# Patient Record
Sex: FEMALE | Race: WHITE | Employment: FULL TIME | URBAN - METROPOLITAN AREA
[De-identification: names, ages, dates, MRNs, and addresses within clinical notes are randomized per-mention and may not be internally consistent; named-entity substitution may affect disease eponyms.]

---

## 2017-03-17 ENCOUNTER — OFFICE VISIT (OUTPATIENT)
Dept: FAMILY MEDICINE | Facility: CLINIC | Age: 57
End: 2017-03-17

## 2017-03-17 VITALS
RESPIRATION RATE: 20 BRPM | WEIGHT: 170 LBS | TEMPERATURE: 98.1 F | HEART RATE: 67 BPM | DIASTOLIC BLOOD PRESSURE: 65 MMHG | SYSTOLIC BLOOD PRESSURE: 120 MMHG | BODY MASS INDEX: 31.28 KG/M2 | HEIGHT: 62 IN

## 2017-03-17 DIAGNOSIS — M25.561 PATELLOFEMORAL INSTABILITY OF RIGHT KNEE WITH PAIN: Primary | ICD-10-CM

## 2017-03-17 DIAGNOSIS — M25.361 PATELLOFEMORAL INSTABILITY OF RIGHT KNEE WITH PAIN: Primary | ICD-10-CM

## 2017-03-17 PROCEDURE — 99214 OFFICE O/P EST MOD 30 MIN: CPT | Performed by: FAMILY MEDICINE

## 2017-03-17 RX ORDER — IPRATROPIUM BROMIDE AND ALBUTEROL SULFATE 2.5; .5 MG/3ML; MG/3ML
3 SOLUTION RESPIRATORY (INHALATION)
COMMUNITY
Start: 2011-05-29

## 2017-03-17 NOTE — PROGRESS NOTES
"  SUBJECTIVE:                                                    Patricia Cedeño is a 56 year old female who presents to clinic today for the following health issues:      Joint Pain     Onset: x2 months    Description:   Location: right knee  Character: Dull ache    Intensity: moderate    Progression of Symptoms: worse    Accompanying Signs & Symptoms:  Other symptoms: none   History:   Previous similar pain: no       Precipitating factors:   Trauma or overuse: YES    Alleviating factors:  Improved by: rest/inactivity       Therapies Tried and outcome:     Fell about 2 months ago and has since had pain. Notes a lot of popping and locking of right knee. Prior to injury 2 months ago, had always had issues with her right knee. Per  this pain is now limiting her from living her life like normal.   She admits she finds herself having to adjust how she positions her knee due to excruciating pain. Usually has the worse pain when going up/down stairs or getting into bed or her car. Denies any numbness or tingling. Also states she feels as \"there is something loose in there that shifted yesterday\".           Problem list and histories reviewed & adjusted, as indicated.  Additional history: as documented    Patient Active Problem List   Diagnosis     CARDIOVASCULAR SCREENING; LDL GOAL LESS THAN 160     Intermittent asthma     Past Surgical History   Procedure Laterality Date     Ectopic pregnancy surgery  1989     removed ovary (? side)       Social History   Substance Use Topics     Smoking status: Never Smoker     Smokeless tobacco: Never Used     Alcohol use No     Family History   Problem Relation Age of Onset     DIABETES Father      type 2           Reviewed and updated as needed this visit by clinical staff  Tobacco  Allergies       Reviewed and updated as needed this visit by Provider         ROS:  Constitutional, msk, neuro systems are negative, except as otherwise noted.    OBJECTIVE:                    " "                                /65 (BP Location: Right arm, Patient Position: Chair, Cuff Size: Adult Large)  Pulse 67  Temp 98.1  F (36.7  C) (Oral)  Resp 20  Ht 5' 2\" (1.575 m)  Wt 170 lb (77.1 kg)  LMP 10/15/2014  Breastfeeding? No  BMI 31.09 kg/m2  Body mass index is 31.09 kg/(m^2).  GENERAL: healthy, alert and no distress  MS: no knee asymmetry noted. Right knee- some joint effusion noted,+ crepitus, negative anterior/posterior drawer test, + valgus, + patella grind test, + namita test   NEURO: sensation intact    Diagnostic Test Results:  none      ASSESSMENT/PLAN:                                                        1. Patellofemoral instability of right knee with pain  - will obtain MRI for further evaluation and refer to ortho once results are available.   Discussed NSAIDS, knee brace for support, ice/heat.   - MR Knee Right w/o Contrast; Future    See Patient Instructions    Tiara Chilel MD  [facility]    "

## 2017-03-17 NOTE — NURSING NOTE
"Chief Complaint   Patient presents with     Knee Pain     right knee pain x2 months       Initial /65 (BP Location: Right arm, Patient Position: Chair, Cuff Size: Adult Large)  Pulse 67  Temp 98.1  F (36.7  C) (Oral)  Resp 20  Ht 5' 2\" (1.575 m)  Wt 170 lb (77.1 kg)  LMP 10/15/2014  Breastfeeding? No  BMI 31.09 kg/m2 Estimated body mass index is 31.09 kg/(m^2) as calculated from the following:    Height as of this encounter: 5' 2\" (1.575 m).    Weight as of this encounter: 170 lb (77.1 kg).  Medication Reconciliation: complete     Micheline Bullard/[facility]---[facility]      "

## 2017-03-17 NOTE — PATIENT INSTRUCTIONS
Recommend knee brace to help support   Follow up once MRI is done     Knee Pain of Uncertain Cause    There are several common causes for knee pain. These can include:    A sprain of the ligaments that support the joint    An injury to the cartilage lining of the joint    Arthritis from wear-and-tear or inflammation  There are other causes as well. There may also be swelling, reduced movement of the knee joint, and pain with walking. A definite diagnosis will still need to be made. If your symptoms do not improve, further follow-up and testing may be needed.  Home care    Stay off the injured leg as much as possible until pain improves.    Apply an ice pack over the injured area for 15 to 20 minutes every 3 to 6 hours. You should do this for the first 24 to 48 hours. You can make an ice pack by filling a plastic bag that seals at the top with ice cubes and then wrapping it with a thin towel. Continue to use ice packs for relief of pain and swelling as needed. As the ice melts, be careful to avoid getting your wrap, splint, or cast wet. After 48 hours, apply heat (warm shower or warm bath) for 15 to 20 minutes several times a day, or alternate ice and heat. If you have to wear a hook-and-loop knee brace, you can open it to apply the ice pack, or heat, directly to the knee. Never put ice directly on the skin. Always wrap the ice in a towel or other type of cloth.    You may use over-the-counter pain medicine to control pain, unless another pain medicine was prescribed. If you have chronic liver or kidney disease or ever had a stomach ulcer or GI bleeding, talk with your healthcare provider using these medicines.    If crutches or a walker have been recommended, do not put weight on the injured leg until you can do so without pain. Check with your healthcare provider before returning to sports or full work duties.    If you have a hook-and-loop knee brace, you can remove it to bathe and sleep, unless told  otherwise.  Follow-up care  Follow up with your healthcare provider as advised. This is usually within 1-2 weeks.  If X-rays were taken, you will be told of any new findings that may affect your care.  Call 911  Call 911 if you have:     Shortness of breath    Chest pain  When to seek medical advice  Call your healthcare provider right away if any of these occur:    Toes or foot becomes swollen, cold, blue, numb, or tingly    Pain or swelling spreads over the knee or calf    Warmth or redness appears over the knee or calf    Other joints become painful    Rash appears    Fever of 100.4 F (38 C) or above lasting for 24 to 48 hours    1603-9989 [employer]. [address] [place] [address], [place] [address]. All rights reserved. This information is not intended as a substitute for professional medical care. Always follow your healthcare professional's instructions.

## 2017-03-18 ASSESSMENT — ASTHMA QUESTIONNAIRES: ACT_TOTALSCORE: 20

## 2017-03-21 ENCOUNTER — HOSPITAL ENCOUNTER (OUTPATIENT)
Dept: MRI IMAGING | Facility: CLINIC | Age: 57
Discharge: HOME | End: 2017-03-21
Attending: FAMILY MEDICINE | Admitting: FAMILY MEDICINE

## 2017-03-21 DIAGNOSIS — M25.361 PATELLOFEMORAL INSTABILITY OF RIGHT KNEE WITH PAIN: ICD-10-CM

## 2017-03-21 DIAGNOSIS — M25.561 PATELLOFEMORAL INSTABILITY OF RIGHT KNEE WITH PAIN: ICD-10-CM

## 2017-03-21 PROCEDURE — 73721 MRI JNT OF LWR EXTRE W/O DYE: CPT | Mod: RT

## 2017-03-23 ENCOUNTER — TELEPHONE (OUTPATIENT)
Dept: FAMILY MEDICINE | Facility: CLINIC | Age: 57
End: 2017-03-23

## 2017-03-23 DIAGNOSIS — M23.329: Primary | ICD-10-CM

## 2017-03-24 NOTE — TELEPHONE ENCOUNTER
L/M to call.  Darlyn Richard RN    Orthopedic  Services at [facility]  All areas ~ [phone]

## 2017-03-24 NOTE — TELEPHONE ENCOUNTER
MRI shows a tear. Recommend follow up with ortho for further evaluation.   Referral placed.     Tiara Chiell MD

## 2017-03-27 ENCOUNTER — OFFICE VISIT (OUTPATIENT)
Dept: ORTHOPEDICS | Facility: CLINIC | Age: 57
End: 2017-03-27

## 2017-03-27 VITALS
SYSTOLIC BLOOD PRESSURE: 104 MMHG | WEIGHT: 170 LBS | BODY MASS INDEX: 31.28 KG/M2 | DIASTOLIC BLOOD PRESSURE: 66 MMHG | HEIGHT: 62 IN

## 2017-03-27 DIAGNOSIS — M17.11 PRIMARY OSTEOARTHRITIS OF RIGHT KNEE: Primary | ICD-10-CM

## 2017-03-27 PROCEDURE — 99203 OFFICE O/P NEW LOW 30 MIN: CPT | Mod: 25 | Performed by: ORTHOPAEDIC SURGERY

## 2017-03-27 PROCEDURE — 20610 DRAIN/INJ JOINT/BURSA W/O US: CPT | Mod: RT | Performed by: ORTHOPAEDIC SURGERY

## 2017-03-27 RX ORDER — DEXAMETHASONE SODIUM PHOSPHATE 4 MG/ML
8 INJECTION, SOLUTION INTRA-ARTICULAR; INTRALESIONAL; INTRAMUSCULAR; INTRAVENOUS; SOFT TISSUE ONCE
Qty: 2 ML | Refills: 0 | OUTPATIENT
Start: 2017-03-27 | End: 2017-03-27

## 2017-03-27 RX ORDER — LIDOCAINE HYDROCHLORIDE 10 MG/ML
8 INJECTION, SOLUTION INFILTRATION; PERINEURAL ONCE
Qty: 8 ML | Refills: 0 | OUTPATIENT
Start: 2017-03-27 | End: 2017-03-27

## 2017-03-27 NOTE — MR AVS SNAPSHOT
"              After Visit Summary   3/27/2017    Patricia Cedeño    MRN: [id number]           Patient Information     Date Of Birth          1960        Visit Information        Provider Department      3/27/2017 8:50 AM Nam Brooke MD [facility] ORTHOPEDIC SURGERY        Care Instructions    We discussed the problem of arthritis today. Arthritis means that the joint surfaces that were once smooth are getting rough. When the rough joint surfaces are loaded and gliding, you often have pain, swelling, catching/popping, and locking type of symptoms. It can be episodic or constant. Even though the process is slow developing and chronic in nature, the symptoms can come on rather suddenly sometimes triggered by an injury or at other times without any identifiable event.  Typically, even with arthritis, most people can  function quite well with a common sense management which include: activity modifications, OTC medications (e.g.. Acetaminophen and Ibuprofen or Naproxen), icing, stretching and muscle strengthening. In general, staying active helps because it will help maintaining joint flexibility and muscle strength although \"overdoing\" and doing repetitively loading activities could worsen the symptoms. If you carry extra weight, losing weight should be a part of management of arthritis. I recommend diet along with gentle aerobic exercises such as walking, elliptical, swimming and stationary biking. Activities like jumping, pivoting, squatting, lunging, stair climbing, and kneeling are better to be avoided.  You can also try over- the- counter braces (especially for the knee arthritis) but since the problem is an internal issue, it is not always helpful.  Formal PT is not always necessary but can be helpful if you are not sure about what exercises to do. A discussion with a dietician can be very helpful if you decided to include weight loss as a part of the treatment.  If these measures are not " "effective, we often resort to the injection treatment, either cortisone or viscous joint supplement. It has been shown that viscous joint injections are not very effective if arthritis is advanced and at this point is approved only for knee arthritis. The potential benefit from injections are not permanent and for that reason they can be repeated at a reasonable frequency. The duration of benefit is impossible to predict. Sometimes, it does not provide any noticeable benefit at all.  As you can imagine, surgical intervention is not the first line of management. It is important to remember that even with surgery one cannot cure arthritis unless joint replacement is considered. As was the case with the injection treatment, the response from arthroscopic surgeries  is unpredictable since we cannot make the joint surfaces back to perfectly smooth. It would be an option for someone who has tried all appropriate non-operative treatment modalities and is not quite ready for replacement type of permanent procedure. Arthroscopic procedures are more applicable to the knees, the shoulders and the ankles as opposed to the hips and fingers.          The diagnosis of \"patella-femoral pain syndrome\" or \"patella-femoral dysfunction\" is a broad inclusive condition that describes pain localized in  the front aspect of a knee. These terms are often interchanged with \"patella chondromalacia\" although chondromalacia specifically refers to softening of  joint surface cartilage. Often, pain is caused by chondromalacia or early arthritis, however, one can have pain even though there are no apparent damages that can be identified on the joint surfaces. In this case the pain is most likely due to mal-tracking of the patella on the femur. In this case, the pressure on the knee cap is not evenly distributed and some part of the patella gets overloaded and thus the pain. This problem is not always easy to identify because mal-tracking is " happening only when one is engaged in an activity such as jumping, running or pivoting. Static examination can be totally normal. There are many potential causes for the mal-tracking: prior injuries, knee mis-alignment, muscle imbalance/weakness, in-toeing gait, hyper-flexibility and shallow groove for the patella.    Pain is typically noted with kneeling, squatting, climbing, going up and down steps and long drives. At an early stage, pain is worse in the late afternoon or in the evening not while one is doing the activities. One can feel and hear cracking, popping and grinding with these activities.   Pain is very often felt in the back of the knee even though the pathology is in the front. This is because of muscle/tendon tightness/spasm resulting from protective compensation by hamstring structures.     Obesity is a big contributing factor to pain related to this issue. The stress under the patella is not in an one-to-one relationship. In other words, 10 Lbs extra weight is not 10 Lbs extra pressure in the patella-femoral joint. With regular activities, the extra pressure in the patella-femoral area is easily 20-40 Lbs. Thus, lies the importance of reducing weight if you carry extra weight.     As far as treatment goes, usual RIM (rest, ice, and medication) should be tried first. Patella sleeve is often helpful but not always. In general, gliding activities such as stationary biking and elliptical are better than the activities that put extra stress to the patella-femoral joint space. These include jumping, lunging, stair climbing, hiking, pivoting and kneeling etc.    In order to better control the movement of the patella on the femur, strong quadriceps muscles are very important. One can have an imbalance of the quad muscle: stronger outer quad (lateralis) compared to inner quad (medialis). In this situation, strengthening of the inner thigh muscle should be main emphasis of the treatment. Working with a  "physical therapist is often utilized for this purpose.    If all appropriate non-surgical options did not result in satisfactory pain relief, surgery can be considered. Unfortunately, not all surgeries result in desired outcome. Depending on the type of operation, healing process is widely different: a couple of weeks to several months. One should remember surgery for this particular diagnosis should not be the first line of treatment armamentarium.        Follow-ups after your visit        Who to contact     If you have questions or need follow up information about today's clinic visit or your schedule please contact [facility] ORTHOPEDIC SURGERY directly at [phone].  Normal or non-critical lab and imaging results will be communicated to you by "[employer]hart, letter or phone within 4 business days after the clinic has received the results. If you do not hear from us within 7 days, please contact the clinic through [employer] or phone. If you have a critical or abnormal lab result, we will notify you by phone as soon as possible.  Submit refill requests through [employer] or call your pharmacy and they will forward the refill request to us. Please allow 3 business days for your refill to be completed.          Additional Information About Your Visit        MyChart Information     [employer] lets you send messages to your doctor, view your test results, renew your prescriptions, schedule appointments and more. To sign up, go to [email or web address] . Click on \"Log in\" on the left side of the screen, which will take you to the Welcome page. Then click on \"Sign up Now\" on the right side of the page.     You will be asked to enter the access code listed below, as well as some personal information. Please follow the directions to create your username and password.     Your access code is: [email or web address]  Expires: 6/15/2017  4:06 PM     Your access code will  in 90 days. If you need help or a new code, please call your " "[facility] or [phone].        Care EveryWhere ID     This is your Care EveryWhere ID. This could be used by other organizations to access your [place] medical records  [id number]        Your Vitals Were     Height Last Period BMI (Body Mass Index)             5' 2\" (1.575 m) 10/15/2014 31.09 kg/m2          Blood Pressure from Last 3 Encounters:   03/27/17 104/66   03/17/17 120/65   11/17/16 127/88    Weight from Last 3 Encounters:   03/27/17 170 lb (77.1 kg)   03/17/17 170 lb (77.1 kg)   11/17/16 160 lb (72.6 kg)              Today, you had the following     No orders found for display       Primary Care Provider Office Phone # Fax #    LIVIA Harper [phone]       [facility] [address] [place] [address]        Thank you!     Thank you for choosing [facility] ORTHOPEDIC SURGERY  for your care. Our goal is always to provide you with excellent care. Hearing back from our patients is one way we can continue to improve our services. Please take a few minutes to complete the written survey that you may receive in the mail after your visit with us. Thank you!             Your Updated Medication List - Protect others around you: Learn how to safely use, store and throw away your medicines at www.disposemymeds.org.          This list is accurate as of: 3/27/17  9:15 AM.  Always use your most recent med list.                   Brand Name Dispense Instructions for use    albuterol 108 (90 BASE) MCG/ACT Inhaler    PROAIR HFA/PROVENTIL HFA/VENTOLIN HFA    1 each    Inhale 2 puffs into the lungs every 6 hours       DUONEB 0.5-2.5 (3) MG/3ML neb solution   Generic drug:  ipratropium - albuterol 0.5 mg/2.5 mg/3 mL      Inhale 3 mLs into the lungs Reported on 3/27/2017       fluocinolone 0.01 % cream    SYNALAR    60 g    Apply 1 g topically 2 times daily       mometasone-formoterol 100-5 MCG/ACT oral inhaler    DULERA    1 Inhaler    Inhale 2 puffs into the " lungs 2 times daily       ZYRTEC ALLERGY 10 MG tablet   Generic drug:  cetirizine      Take 10 mg by mouth daily.

## 2017-03-27 NOTE — LETTER
3/27/2017       RE: Patricia Cedeño  [id number] [facility] [id number]           Dear Colleague,    Thank you for referring your patient, Patricia Cedeño, to the [facility]. Please see a copy of my visit note below.    HISTORY OF PRESENT ILLNESS:    Patricia Cedeño is a 56 year old female who is seen in consultation at the request of Dr. Chilel for right knee pain    Present symptoms: Pt states she fell on the ice about 2 months ago, landing on the right knee.  Pt states pain is over the anterior knee, has locking, catching, clicking and grinding.  Pt states pain is worse throughout the day.  Pt states stairs are painful, sit to stand transitions as well.  Keeping knee flexed will cause knee to ache.  Pt states pain has been worsening over time.  She has been having intermittent catching and pain with a right knee  Over the last two years. She has noted some catching sensation in the left knee as well.  She has difficulty of getting up from sitting. Getting out of the car is painful.  Treatments tried to this point: MRI, ibuprofen, tylenol, ice, knee brace  Orthopedic PMH: no ortho surgeries     Past Medical History:   Diagnosis Date     Acute eczema of hand      Uncomplicated asthma        Past Surgical History:   Procedure Laterality Date     ECTOPIC PREGNANCY SURGERY  1989    removed ovary (? side)       Family History   Problem Relation Age of Onset     DIABETES Father      type 2       Social History     Social History     Marital status:      Spouse name: N/A     Number of children: N/A     Years of education: N/A     Occupational History     Not on file.     Social History Main Topics     Smoking status: Never Smoker     Smokeless tobacco: Never Used     Alcohol use No     Drug use: No     Sexual activity: Yes     Partners: Male      Comment: Vas.     Other Topics Concern     Not on file     Social History Narrative       Current Outpatient  "Prescriptions   Medication Sig Dispense Refill     mometasone-formoterol (DULERA) 100-5 MCG/ACT oral inhaler Inhale 2 puffs into the lungs 2 times daily 1 Inhaler 12     fluocinolone (SYNALAR) 0.01 % cream Apply 1 g topically 2 times daily 60 g 5     cetirizine (ZYRTEC ALLERGY) 10 MG tablet Take 10 mg by mouth daily.       ipratropium - albuterol 0.5 mg/2.5 mg/3 mL (DUONEB) 0.5-2.5 (3) MG/3ML neb solution Inhale 3 mLs into the lungs Reported on 3/27/2017       albuterol (PROAIR HFA, PROVENTIL HFA, VENTOLIN HFA) 108 (90 BASE) MCG/ACT inhaler Inhale 2 puffs into the lungs every 6 hours (Patient not taking: Reported on 3/27/2017) 1 each 12       Allergies   Allergen Reactions     Escitalopram Nausea     Metoclopramide      Dystonic reaction     Prochlorperazine        REVIEW OF SYSTEMS:  CONSTITUTIONAL:  NEGATIVE for fever, chills, change in weight  INTEGUMENTARY/SKIN:  Eczema. NEGATIVE for worrisome rashes, moles or lesions  EYES:  NEGATIVE for vision changes or irritation  ENT/MOUTH:  NEGATIVE for ear, mouth and throat problems  RESP:  SOB, asthma   BREAST:  NEGATIVE for masses, tenderness or discharge  CV:  NEGATIVE for chest pain, palpitations or peripheral edema  GI:  Acid reflux / heartburn  :  Negative   MUSCULOSKELETAL:  See HPI above  NEURO:  NEGATIVE for weakness, dizziness or paresthesias  ENDOCRINE:  NEGATIVE for temperature intolerance, skin/hair changes  HEME/ALLERGY/IMMUNE:  NEGATIVE for bleeding problems  PSYCHIATRIC:  NEGATIVE for changes in mood or affect      PHYSICAL EXAM:  /66 (BP Location: Right arm, Patient Position: Chair, Cuff Size: Adult Regular)  Ht 5' 2\" (1.575 m)  Wt 170 lb (77.1 kg)  LMP 10/15/2014  BMI 31.09 kg/m2  Body mass index is 31.09 kg/(m^2).   GENERAL APPEARANCE: healthy, alert and no distress   HEENT: No apparent thyroid megaly. Clear sclera with normal ocular movement  RESPIRATORY: No labored breathing  SKIN: no suspicious lesions or rashes  NEURO: Normal strength " and tone, mentation intact and speech normal  VASCULAR: Good pulses, and capillary refill   LYMPH: no lymphadenopathy   PSYCH:  mentation appears normal and affect normal/bright    MUSCULOSKELETAL:  She walks with noticeable limp  Minimal swelling on the right knee compared to the left  Range of motion is virtually symmetrical  She has marked crepitus on the right and moderate crepitus in the left knee at the patellofemoral joint  Positive tenderness patellofemoral joint, right  Similarly, left patellofemoral joint is tender as well  Medial joint line is tender more so on the left knee than right  Ligaments are stable throughout  Extensor mechanism is intact     ASSESSMENT:  Patellofemoral DJD, main source of the pain, right knee  Relatively asymptomatic patellofemoral DJD, left knee  Bilateral degenerative medial meniscus tear, not symptomatic    PLAN:  Current situation was thoroughly explained.  Despite presence of meniscus pathology noted on MRI scan, the main pathology was felt to be significant chondromalacia of the patella.  Recent falling resulting in hitting of the patellofemoral joint was felt to the main source of aggravation of the underlying problem.  The images of MRI scan from March 21, 2017 were visualized. Findings were thoroughly explained.  All questions were answered.  With understanding this patient, at this point, a core injection was felt to be very reasonable.  Potential risks and benefits of cortisone injection were explained.  Follow-up in six weeks if pain persists.      Procedure Note:  After risks and benefits were explained and questions answered, pt agreed to undergo a right knee injection. Under sterile prepping of the skin with alcohol and with use of ethyl chloride, a combination of 8 mg of dexamethasone and 8 ml of 1% Xylocaine was given into the knee joint. Inferomedial approach was used for the injection. Patient tolerated it well. No apparent complications from the procedure  noted.      Imaging Interpretation:     Recent Results (from the past 744 hour(s))   MR Knee Right w/o Contrast    Narrative    MR KNEE RIGHT WITHOUT CONTRAST 3/21/2017 5:00 PM    HISTORY:  Worsening right knee pain. Other instability, right knee.  Pain in right knee.    TECHNIQUE:  Axial and coronal T2 with fat suppression. Coronal T1.  Sagittal dual echo T2.    FINDINGS:   Medial Meniscus: Moderate extrusion of the body segment. There is  degenerative tearing of the posterior horn with subtle articular  extension; This could require careful probing at arthroscopy. There is  also asymmetric intrasubstance signal within the body segment with no  definite articular surface tear identified. However, if arthroscopy is  performed, close attention should be directed to this region.       Lateral Meniscus: No tear, displaced fragment, or extrusion.        Anterior Cruciate Ligament: Intact.     Posterior Cruciate Ligament: Intact.     Medial Collateral Ligament: Intact.    Lateral Collateral Ligament Complex, Popliteus Tendon: The iliotibial  band, fibular collateral ligament, biceps femoris tendon, and  popliteus tendon are intact.    Osseous and Cartilaginous Structures: Patellar chondromalacia  including grade 3 involvement. Grade II chondromalacia of the medial  trochlea.    Extensor Mechanism: The quadriceps and patellar tendons are intact.  The medial and lateral patellar retinacula appear unremarkable.    Joint Space: Mild-moderate joint effusion. No definite loose bodies  appreciated.    Additional Findings: No Baker's cyst.  No semimembranosus-tibial  collateral ligament or pes anserine bursitis.      Impression    IMPRESSION:  1. Mild focal degenerative tearing of the posterior horn medial  meniscus. Intrasubstance signal within the medial body segment, as  above.  2. Patellofemoral chondromalacia.  3. Mild-moderate effusion.    MD Nam PINTO MD  Department of Orthopedic Surgery         Disclaimer: This note consists of symbols derived from keyboarding, dictation and/or voice recognition software. As a result, there may be errors in the script that have gone undetected. Please consider this when interpreting information found in this chart.      Again, thank you for allowing me to participate in the care of your patient.        Sincerely,    Nam Brooke MD

## 2017-03-27 NOTE — NURSING NOTE
"Chief Complaint   Patient presents with     Knee Pain     Right knee       Initial /66 (BP Location: Right arm, Patient Position: Chair, Cuff Size: Adult Regular)  Ht 5' 2\" (1.575 m)  Wt 170 lb (77.1 kg)  LMP 10/15/2014  BMI 31.09 kg/m2 Estimated body mass index is 31.09 kg/(m^2) as calculated from the following:    Height as of this encounter: 5' 2\" (1.575 m).    Weight as of this encounter: 170 lb (77.1 kg).  Medication Reconciliation: complete    "

## 2017-03-27 NOTE — PROGRESS NOTES
HISTORY OF PRESENT ILLNESS:    Patricia Cedeño is a 56 year old female who is seen in consultation at the request of Dr. Chilel for right knee pain    Present symptoms: Pt states she fell on the ice about 2 months ago, landing on the right knee.  Pt states pain is over the anterior knee, has locking, catching, clicking and grinding.  Pt states pain is worse throughout the day.  Pt states stairs are painful, sit to stand transitions as well.  Keeping knee flexed will cause knee to ache.  Pt states pain has been worsening over time.  She has been having intermittent catching and pain with a right knee  Over the last two years. She has noted some catching sensation in the left knee as well.  She has difficulty of getting up from sitting. Getting out of the car is painful.  Treatments tried to this point: MRI, ibuprofen, tylenol, ice, knee brace  Orthopedic PMH: no ortho surgeries     Past Medical History:   Diagnosis Date     Acute eczema of hand      Uncomplicated asthma        Past Surgical History:   Procedure Laterality Date     ECTOPIC PREGNANCY SURGERY  1989    removed ovary (? side)       Family History   Problem Relation Age of Onset     DIABETES Father      type 2       Social History     Social History     Marital status:      Spouse name: N/A     Number of children: N/A     Years of education: N/A     Occupational History     Not on file.     Social History Main Topics     Smoking status: Never Smoker     Smokeless tobacco: Never Used     Alcohol use No     Drug use: No     Sexual activity: Yes     Partners: Male      Comment: Vas.     Other Topics Concern     Not on file     Social History Narrative       Current Outpatient Prescriptions   Medication Sig Dispense Refill     mometasone-formoterol (DULERA) 100-5 MCG/ACT oral inhaler Inhale 2 puffs into the lungs 2 times daily 1 Inhaler 12     fluocinolone (SYNALAR) 0.01 % cream Apply 1 g topically 2 times daily 60 g 5     cetirizine (ZYRTEC  "ALLERGY) 10 MG tablet Take 10 mg by mouth daily.       ipratropium - albuterol 0.5 mg/2.5 mg/3 mL (DUONEB) 0.5-2.5 (3) MG/3ML neb solution Inhale 3 mLs into the lungs Reported on 3/27/2017       albuterol (PROAIR HFA, PROVENTIL HFA, VENTOLIN HFA) 108 (90 BASE) MCG/ACT inhaler Inhale 2 puffs into the lungs every 6 hours (Patient not taking: Reported on 3/27/2017) 1 each 12       Allergies   Allergen Reactions     Escitalopram Nausea     Metoclopramide      Dystonic reaction     Prochlorperazine        REVIEW OF SYSTEMS:  CONSTITUTIONAL:  NEGATIVE for fever, chills, change in weight  INTEGUMENTARY/SKIN:  Eczema. NEGATIVE for worrisome rashes, moles or lesions  EYES:  NEGATIVE for vision changes or irritation  ENT/MOUTH:  NEGATIVE for ear, mouth and throat problems  RESP:  SOB, asthma   BREAST:  NEGATIVE for masses, tenderness or discharge  CV:  NEGATIVE for chest pain, palpitations or peripheral edema  GI:  Acid reflux / heartburn  :  Negative   MUSCULOSKELETAL:  See HPI above  NEURO:  NEGATIVE for weakness, dizziness or paresthesias  ENDOCRINE:  NEGATIVE for temperature intolerance, skin/hair changes  HEME/ALLERGY/IMMUNE:  NEGATIVE for bleeding problems  PSYCHIATRIC:  NEGATIVE for changes in mood or affect      PHYSICAL EXAM:  /66 (BP Location: Right arm, Patient Position: Chair, Cuff Size: Adult Regular)  Ht 5' 2\" (1.575 m)  Wt 170 lb (77.1 kg)  LMP 10/15/2014  BMI 31.09 kg/m2  Body mass index is 31.09 kg/(m^2).   GENERAL APPEARANCE: healthy, alert and no distress   HEENT: No apparent thyroid megaly. Clear sclera with normal ocular movement  RESPIRATORY: No labored breathing  SKIN: no suspicious lesions or rashes  NEURO: Normal strength and tone, mentation intact and speech normal  VASCULAR: Good pulses, and capillary refill   LYMPH: no lymphadenopathy   PSYCH:  mentation appears normal and affect normal/bright    MUSCULOSKELETAL:  She walks with noticeable limp  Minimal swelling on the right knee " compared to the left  Range of motion is virtually symmetrical  She has marked crepitus on the right and moderate crepitus in the left knee at the patellofemoral joint  Positive tenderness patellofemoral joint, right  Similarly, left patellofemoral joint is tender as well  Medial joint line is tender more so on the left knee than right  Ligaments are stable throughout  Extensor mechanism is intact     ASSESSMENT:  Patellofemoral DJD, main source of the pain, right knee  Relatively asymptomatic patellofemoral DJD, left knee  Bilateral degenerative medial meniscus tear, not symptomatic    PLAN:  Current situation was thoroughly explained.  Despite presence of meniscus pathology noted on MRI scan, the main pathology was felt to be significant chondromalacia of the patella.  Recent falling resulting in hitting of the patellofemoral joint was felt to the main source of aggravation of the underlying problem.  The images of MRI scan from March 21, 2017 were visualized. Findings were thoroughly explained.  All questions were answered.  With understanding this patient, at this point, a core injection was felt to be very reasonable.  Potential risks and benefits of cortisone injection were explained.  Follow-up in six weeks if pain persists.      Procedure Note:  After risks and benefits were explained and questions answered, pt agreed to undergo a right knee injection. Under sterile prepping of the skin with alcohol and with use of ethyl chloride, a combination of 8 mg of dexamethasone and 8 ml of 1% Xylocaine was given into the knee joint. Inferomedial approach was used for the injection. Patient tolerated it well. No apparent complications from the procedure noted.      Imaging Interpretation:     Recent Results (from the past 744 hour(s))   MR Knee Right w/o Contrast    Narrative    MR KNEE RIGHT WITHOUT CONTRAST 3/21/2017 5:00 PM    HISTORY:  Worsening right knee pain. Other instability, right knee.  Pain in right  knee.    TECHNIQUE:  Axial and coronal T2 with fat suppression. Coronal T1.  Sagittal dual echo T2.    FINDINGS:   Medial Meniscus: Moderate extrusion of the body segment. There is  degenerative tearing of the posterior horn with subtle articular  extension; This could require careful probing at arthroscopy. There is  also asymmetric intrasubstance signal within the body segment with no  definite articular surface tear identified. However, if arthroscopy is  performed, close attention should be directed to this region.       Lateral Meniscus: No tear, displaced fragment, or extrusion.        Anterior Cruciate Ligament: Intact.     Posterior Cruciate Ligament: Intact.     Medial Collateral Ligament: Intact.    Lateral Collateral Ligament Complex, Popliteus Tendon: The iliotibial  band, fibular collateral ligament, biceps femoris tendon, and  popliteus tendon are intact.    Osseous and Cartilaginous Structures: Patellar chondromalacia  including grade 3 involvement. Grade II chondromalacia of the medial  trochlea.    Extensor Mechanism: The quadriceps and patellar tendons are intact.  The medial and lateral patellar retinacula appear unremarkable.    Joint Space: Mild-moderate joint effusion. No definite loose bodies  appreciated.    Additional Findings: No Baker's cyst.  No semimembranosus-tibial  collateral ligament or pes anserine bursitis.      Impression    IMPRESSION:  1. Mild focal degenerative tearing of the posterior horn medial  meniscus. Intrasubstance signal within the medial body segment, as  above.  2. Patellofemoral chondromalacia.  3. Mild-moderate effusion.    MD Nam PINTO MD  Department of Orthopedic Surgery        Disclaimer: This note consists of symbols derived from keyboarding, dictation and/or voice recognition software. As a result, there may be errors in the script that have gone undetected. Please consider this when interpreting information found in this chart.

## 2017-06-17 ENCOUNTER — HEALTH MAINTENANCE LETTER (OUTPATIENT)
Age: 57
End: 2017-06-17

## 2018-06-23 ENCOUNTER — HEALTH MAINTENANCE LETTER (OUTPATIENT)
Age: 58
End: 2018-06-23

## 2018-10-02 ENCOUNTER — HOSPITAL ENCOUNTER (EMERGENCY)
Facility: CLINIC | Age: 58
Discharge: HOME | End: 2018-10-02
Attending: NURSE PRACTITIONER | Admitting: NURSE PRACTITIONER

## 2018-10-02 ENCOUNTER — APPOINTMENT (OUTPATIENT)
Dept: CT IMAGING | Facility: CLINIC | Age: 58
End: 2018-10-02
Attending: NURSE PRACTITIONER

## 2018-10-02 VITALS
SYSTOLIC BLOOD PRESSURE: 122 MMHG | TEMPERATURE: 98.6 F | OXYGEN SATURATION: 98 % | BODY MASS INDEX: 26.16 KG/M2 | DIASTOLIC BLOOD PRESSURE: 64 MMHG | WEIGHT: 143 LBS | RESPIRATION RATE: 16 BRPM

## 2018-10-02 DIAGNOSIS — N39.0 UTI (URINARY TRACT INFECTION), UNCOMPLICATED: ICD-10-CM

## 2018-10-02 LAB
ALBUMIN UR-MCNC: NEGATIVE MG/DL
ANION GAP SERPL CALCULATED.3IONS-SCNC: 3 MMOL/L (ref 3–14)
APPEARANCE UR: CLEAR
BACTERIA #/AREA URNS HPF: ABNORMAL /HPF
BILIRUB UR QL STRIP: NEGATIVE
BUN SERPL-MCNC: 11 MG/DL (ref 7–30)
CALCIUM SERPL-MCNC: 8.6 MG/DL (ref 8.5–10.1)
CHLORIDE SERPL-SCNC: 108 MMOL/L (ref 94–109)
CO2 SERPL-SCNC: 30 MMOL/L (ref 20–32)
COLOR UR AUTO: COLORLESS
CREAT SERPL-MCNC: 0.73 MG/DL (ref 0.52–1.04)
ERYTHROCYTE [DISTWIDTH] IN BLOOD BY AUTOMATED COUNT: 13.2 % (ref 10–15)
GFR SERPL CREATININE-BSD FRML MDRD: 83 ML/MIN/1.7M2
GLUCOSE SERPL-MCNC: 89 MG/DL (ref 70–99)
GLUCOSE UR STRIP-MCNC: NEGATIVE MG/DL
HCT VFR BLD AUTO: 42.3 % (ref 35–47)
HGB BLD-MCNC: 14.2 G/DL (ref 11.7–15.7)
HGB UR QL STRIP: ABNORMAL
KETONES UR STRIP-MCNC: NEGATIVE MG/DL
LEUKOCYTE ESTERASE UR QL STRIP: ABNORMAL
MCH RBC QN AUTO: 28.1 PG (ref 26.5–33)
MCHC RBC AUTO-ENTMCNC: 33.6 G/DL (ref 31.5–36.5)
MCV RBC AUTO: 84 FL (ref 78–100)
NITRATE UR QL: NEGATIVE
PH UR STRIP: 7 PH (ref 5–7)
PLATELET # BLD AUTO: 177 10E9/L (ref 150–450)
POTASSIUM SERPL-SCNC: 3.5 MMOL/L (ref 3.4–5.3)
RBC # BLD AUTO: 5.06 10E12/L (ref 3.8–5.2)
RBC #/AREA URNS AUTO: 8 /HPF (ref 0–2)
SODIUM SERPL-SCNC: 141 MMOL/L (ref 133–144)
SOURCE: ABNORMAL
SP GR UR STRIP: 1.01 (ref 1–1.03)
SQUAMOUS #/AREA URNS AUTO: <1 /HPF (ref 0–1)
UROBILINOGEN UR STRIP-MCNC: 0 MG/DL (ref 0–2)
WBC # BLD AUTO: 12 10E9/L (ref 4–11)
WBC #/AREA URNS AUTO: 40 /HPF (ref 0–5)
WBC CLUMPS #/AREA URNS HPF: PRESENT /HPF

## 2018-10-02 PROCEDURE — 80048 BASIC METABOLIC PNL TOTAL CA: CPT | Performed by: NURSE PRACTITIONER

## 2018-10-02 PROCEDURE — 99285 EMERGENCY DEPT VISIT HI MDM: CPT | Mod: 25

## 2018-10-02 PROCEDURE — 25000128 H RX IP 250 OP 636: Performed by: NURSE PRACTITIONER

## 2018-10-02 PROCEDURE — 96360 HYDRATION IV INFUSION INIT: CPT | Mod: 59

## 2018-10-02 PROCEDURE — 96361 HYDRATE IV INFUSION ADD-ON: CPT

## 2018-10-02 PROCEDURE — 74177 CT ABD & PELVIS W/CONTRAST: CPT

## 2018-10-02 PROCEDURE — 81001 URINALYSIS AUTO W/SCOPE: CPT | Performed by: NURSE PRACTITIONER

## 2018-10-02 PROCEDURE — 85027 COMPLETE CBC AUTOMATED: CPT | Performed by: NURSE PRACTITIONER

## 2018-10-02 RX ORDER — IOPAMIDOL 755 MG/ML
500 INJECTION, SOLUTION INTRAVASCULAR ONCE
Status: COMPLETED | OUTPATIENT
Start: 2018-10-02 | End: 2018-10-02

## 2018-10-02 RX ORDER — CEPHALEXIN 500 MG/1
500 CAPSULE ORAL 2 TIMES DAILY
Qty: 20 CAPSULE | Refills: 0 | Status: SHIPPED | OUTPATIENT
Start: 2018-10-02 | End: 2018-10-12

## 2018-10-02 RX ADMIN — SODIUM CHLORIDE 58 ML: 9 INJECTION, SOLUTION INTRAVENOUS at 20:49

## 2018-10-02 RX ADMIN — IOPAMIDOL 72 ML: 755 INJECTION, SOLUTION INTRAVENOUS at 20:49

## 2018-10-02 RX ADMIN — SODIUM CHLORIDE 1000 ML: 9 INJECTION, SOLUTION INTRAVENOUS at 20:35

## 2018-10-02 ASSESSMENT — ENCOUNTER SYMPTOMS
ABDOMINAL PAIN: 1
VOMITING: 0
NAUSEA: 0
COUGH: 0
FREQUENCY: 0
DIARRHEA: 1
ROS GI COMMENTS: URGENCY
DYSURIA: 0
FEVER: 0
DIFFICULTY URINATING: 0
CHILLS: 0

## 2018-10-02 NOTE — ED AVS SNAPSHOT
[facility] Emergency Department    [address]    [facility] [address]    Phone:  [phone]    Fax:  [phone]                                       Patricia Cedeño   MRN: [id number]    Department:  [facility] Emergency Department   Date of Visit:  10/2/2018           Patient Information     Date Of Birth          1960        Your diagnoses for this visit were:     UTI (urinary tract infection), uncomplicated        You were seen by Dustin Moss APRN CNP.      Follow-up Information     Follow up with Stephanie Jackman MD In 3 days.    Specialty:  Family Practice    Why:  if continuned symptoms or sooner if worsening    Contact information:    [facility]  [address]   [place] [address]  [phone]          Follow up with [facility] Emergency Department.    Specialty:  EMERGENCY MEDICINE    Why:  If symptoms worsen    Contact information:    [address]  [facility] [id number] [phone]        Discharge Instructions       Discharge Instructions  Urinary Tract Infection  You or your child have been diagnosed with a urinary tract infection, or UTI. The urinary tract includes the kidneys (which make urine/pee), ureters (the tubes that carry urine/pee from the kidneys to the bladder), the bladder (which stores urine/pee), and urethra (the tube that carries urine/pee out of the bladder). Urinary tract infections occur when bacteria travel up the urethra into the bladder (bladder infection) and, in some cases, from there into the kidneys (kidney infection).  Generally, every Emergency Department visit should have a follow-up clinic visit with either a primary or a specialty clinic/provider. Please follow-up as instructed by your emergency provider today.  Return to the Emergency Department if:    You or your child have severe back pain.    You or your child are vomiting (throwing up) so that you cannot  take your medicine.    You or your child have a new fever (had not previously had a fever) over 101 F.    You or your child have confusion or are very weak, or feel very ill.    Your child seems much more ill, will not wake up, will not respond right, or is crying for a long time and will not calm down.    You or your child are showing signs of dehydration. These signs may include decreased urination (pee), dry mouth/gums/tongue, or decreased activity.    Follow-up with your provider:     Children under 24 months need to be seen by their regular provider within one week after a diagnosis of a UTI. It may be necessary to do some more tests to look at the child s kidney or bladder.    You should begin to feel better within 24 - 48 hours of starting your antibiotic; follow-up with your regular clinic/doctor/provider if this is not the case.    Treatment:     You will be treated with an antibiotic to kill the bacteria. We have to make an educated guess, based on what we know about common bacteria and antibiotics, as to which antibiotic will work for your infection. We will be correct most times but there will be some cases where the antibiotic chosen is not correct (see urine cultures below).    Take a pain medication such as acetaminophen (Tylenol ) or ibuprofen (Advil , Motrin , Nuprin ).    Phenazopyridine (Pyridium , Uristat ) is a prescription medication that numbs the bladder to reduce the burning pain of some UTIs.  The same medication is available in a non-prescription version (Azo-Standard , Urodol ). This medication will change the color of the urine and tears (usually blue or orange). If you wear contacts, do not wear them while taking this medication as they may be stained by the medication.    Urine Cultures:    If indicated, a urine culture may have been performed today. This test generally takes 24-48 hours to complete so the results are not known at this time. The results can confirm that an infection is  "present but also determine which antibiotic is effective for the specific bacteria that is causing the infection. If your urine culture shows that the antibiotic you were given today will not work to treat your infection, we will attempt to contact you to make arrangements to change the antibiotic. If the culture confirms that the antibiotic is effective for your infection, you will not be contacted. We often recommend follow-up with your regular physician/provider on the culture results regardless of this process.    Antibiotic Warning:     If you have been placed on antibiotics - watch for signs of allergic reaction.  These include rash, lip swelling, difficulty breathing, wheezing, and dizziness.  If you develop any of these symptoms, stop the antibiotic immediately and go to an emergency room or urgent care for evaluation.    Probiotics: If you have been given an antibiotic, you may want to also take a probiotic pill or eat yogurt with live cultures. Probiotics have \"good bacteria\" to help your intestines stay healthy. Studies have shown that probiotics help prevent diarrhea and other intestine problems (including C. diff infection) when you take antibiotics. You can buy these without a prescription in the pharmacy section of the store.   If you were given a prescription for medicine here today, be sure to read all of the information (including the package insert) that comes with your prescription.  This will include important information about the medicine, its side effects, and any warnings that you need to know about.  The pharmacist who fills the prescription can provide more information and answer questions you may have about the medicine.  If you have questions or concerns that the pharmacist cannot address, please call or return to the Emergency Department.   Remember that you can always come back to the Emergency Department if you are not able to see your regular provider in the amount of time listed " above, if you get any new symptoms, or if there is anything that worries you.    24 Hour Appointment Hotline       To make an appointment at any [facility], call [phone]). If you don't have a family doctor or clinic, we will help you find one. [facility] are conveniently located to serve the needs of you and your family.             Review of your medicines      START taking        Dose / Directions Last dose taken    cephALEXin 500 MG capsule   Commonly known as:  KEFLEX   Dose:  500 mg   Quantity:  20 capsule        Take 1 capsule (500 mg) by mouth 2 times daily for 10 days   Refills:  0          Our records show that you are taking the medicines listed below. If these are incorrect, please call your family doctor or clinic.        Dose / Directions Last dose taken    albuterol 108 (90 Base) MCG/ACT inhaler   Commonly known as:  PROAIR HFA/PROVENTIL HFA/VENTOLIN HFA   Dose:  2 puff   Quantity:  1 each        Inhale 2 puffs into the lungs every 6 hours   Refills:  12        DUONEB 0.5-2.5 (3) MG/3ML neb solution   Dose:  3 mL   Generic drug:  ipratropium - albuterol 0.5 mg/2.5 mg/3 mL        Inhale 3 mLs into the lungs Reported on 3/27/2017   Refills:  0        fluocinolone 0.01 % cream   Commonly known as:  SYNALAR   Dose:  1 g   Quantity:  60 g        Apply 1 g topically 2 times daily   Refills:  5        mometasone-formoterol 100-5 MCG/ACT oral inhaler   Commonly known as:  DULERA   Dose:  2 puff   Quantity:  1 Inhaler        Inhale 2 puffs into the lungs 2 times daily   Refills:  12        ZYRTEC ALLERGY 10 MG tablet   Dose:  10 mg   Generic drug:  cetirizine        Take 10 mg by mouth daily.   Refills:  0                Prescriptions were sent or printed at these locations (1 Prescription)                   Other Prescriptions                Printed at Department/Unit printer (1 of 1)         cephALEXin (KEFLEX) 500 MG capsule                Procedures and tests performed  during your visit     Basic metabolic panel    CBC (platelets, no diff)    CT Abdomen Pelvis w Contrast    Give 20 ounces of water 15 minutes before CT of abdomen    Saline Lock IV    UA reflex to Microscopic      Orders Needing Specimen Collection     None      Pending Results     Date and Time Order Name Status Description    10/2/2018 2029 CT Abdomen Pelvis w Contrast Preliminary             Pending Culture Results     No orders found from 9/30/2018 to 10/3/2018.            Pending Results Instructions     If you had any lab results that were not finalized at the time of your Discharge, you can call the ED Lab Result RN at [phone]. You will be contacted by this team for any positive Lab results or changes in treatment. The nurses are available 7 days a week from 10A to 6:30P.  You can leave a message 24 hours per day and they will return your call.        Test Results From Your Hospital Stay        10/2/2018  8:42 PM      Component Results     Component Value Ref Range & Units Status    WBC 12.0 (H) 4.0 - 11.0 10e9/L Final    RBC Count 5.06 3.8 - 5.2 10e12/L Final    Hemoglobin 14.2 11.7 - 15.7 g/dL Final    Hematocrit 42.3 35.0 - 47.0 % Final    MCV 84 78 - 100 fl Final    MCH 28.1 26.5 - 33.0 pg Final    MCHC 33.6 31.5 - 36.5 g/dL Final    RDW 13.2 10.0 - 15.0 % Final    Platelet Count 177 150 - 450 10e9/L Final         10/2/2018  8:56 PM      Component Results     Component Value Ref Range & Units Status    Sodium 141 133 - 144 mmol/L Final    Potassium 3.5 3.4 - 5.3 mmol/L Final    Chloride 108 94 - 109 mmol/L Final    Carbon Dioxide 30 20 - 32 mmol/L Final    Anion Gap 3 3 - 14 mmol/L Final    Glucose 89 70 - 99 mg/dL Final    Urea Nitrogen 11 7 - 30 mg/dL Final    Creatinine 0.73 0.52 - 1.04 mg/dL Final    GFR Estimate 83 >60 mL/min/1.7m2 Final    Non  GFR Calc    GFR Estimate If Black >90 >60 mL/min/1.7m2 Final    African American GFR Calc    Calcium 8.6 8.5 - 10.1 mg/dL Final          10/2/2018  9:46 PM      Component Results     Component Value Ref Range & Units Status    Color Urine Colorless  Final    Appearance Urine Clear  Final    Glucose Urine Negative NEG^Negative mg/dL Final    Bilirubin Urine Negative NEG^Negative Final    Ketones Urine Negative NEG^Negative mg/dL Final    Specific Gravity Urine 1.009 1.003 - 1.035 Final    Blood Urine Large (A) NEG^Negative Final    pH Urine 7.0 5.0 - 7.0 pH Final    Protein Albumin Urine Negative NEG^Negative mg/dL Final    Urobilinogen mg/dL 0.0 0.0 - 2.0 mg/dL Final    Nitrite Urine Negative NEG^Negative Final    Leukocyte Esterase Urine Large (A) NEG^Negative Final    Source Midstream Urine  Final    RBC Urine 8 (H) 0 - 2 /HPF Final    WBC Urine 40 (H) 0 - 5 /HPF Final    WBC Clumps Present (A) NEG^Negative /HPF Final    Bacteria Urine Few (A) NEG^Negative /HPF Final    Squamous Epithelial /HPF Urine <1 0 - 1 /HPF Final         10/2/2018 10:00 PM      Narrative     CT ABDOMEN AND PELVIS WITH CONTRAST 10/2/2018 8:58 PM     HISTORY: Lower abdominal pain, history of diverticulitis.     TECHNIQUE: Volumetric acquisition through abdomen and pelvis with  IV  contrast.  72mL Isovue-370  Radiation dose for this scan was reduced  using automated exposure control, adjustment of the mA and/or kV  according to patient size, or iterative reconstruction technique.    COMPARISON: None.    FINDINGS: Multiple liver cysts. Mild fat stranding surrounding the  gallbladder. Gallbladder is not well distended and there are no  obvious calcified gallstones, but findings could be due to  cholecystitis. Pancreas, spleen, adrenal glands and kidneys  demonstrate no worrisome findings. Prominent extrarenal pelves both  kidneys. A few small nonspecific mesenteric lymph nodes in the upper  abdomen.    Uterus is present. No suspicious pelvic masses. Diverticula which are  most numerous in the sigmoid colon. No convincing diverticulitis.  Portions of the left colon have a  slightly thick-walled appearance,  though they are not distended and this may be physiologic. Uterus is  present. No suspicious pelvic masses. No bowel obstruction or ascites.        Impression     IMPRESSION:  1. Mild fat stranding adjacent to the gallbladder. Cholecystitis is a  possibility. Correlate clinically and with ultrasound if indicated.  2. Colonic diverticula without convincing diverticulitis.  3. Slight thick-walled appearance of the left colon probably  physiologic though mild left-sided colitis could not be completely  excluded.                Clinical Quality Measure: Blood Pressure Screening     Your blood pressure was checked while you were in the emergency department today. The last reading we obtained was  BP: 145/74 . Please read the guidelines below about what these numbers mean and what you should do about them.  If your systolic blood pressure (the top number) is less than 120 and your diastolic blood pressure (the bottom number) is less than 80, then your blood pressure is normal. There is nothing more that you need to do about it.  If your systolic blood pressure (the top number) is 120-139 or your diastolic blood pressure (the bottom number) is 80-89, your blood pressure may be higher than it should be. You should have your blood pressure rechecked within a year by a primary care provider.  If your systolic blood pressure (the top number) is 140 or greater or your diastolic blood pressure (the bottom number) is 90 or greater, you may have high blood pressure. High blood pressure is treatable, but if left untreated over time it can put you at risk for heart attack, stroke, or kidney failure. You should have your blood pressure rechecked by a primary care provider within the next 4 weeks.  If your provider in the emergency department today gave you specific instructions to follow-up with your doctor or provider even sooner than that, you should follow that instruction and not wait for up to  "4 weeks for your follow-up visit.        Thank you for choosing [facility]       Thank you for choosing [facility] for your care. Our goal is always to provide you with excellent care. Hearing back from our patients is one way we can continue to improve our services. Please take a few minutes to complete the written survey that you may receive in the mail after you visit with us. Thank you!        [employer] Information     [employer] lets you send messages to your doctor, view your test results, renew your prescriptions, schedule appointments and more. To sign up, go to [email or web address] . Click on \"Log in\" on the left side of the screen, which will take you to the Welcome page. Then click on \"Sign up Now\" on the right side of the page.     You will be asked to enter the access code listed below, as well as some personal information. Please follow the directions to create your username and password.     Your access code is: [email or web address]  Expires: 2018 10:14 PM     Your access code will  in 90 days. If you need help or a new code, please call your [facility] clinic or [phone].        Care EveryWhere ID     This is your Care EveryWhere ID. This could be used by other organizations to access your [facility] medical records  [id number]        Equal Access to Services     LASHAE MTZ : Lexie Stanton, waaxda luqadaha, qaybta kaalmada etta, mirella wall. So [facility] [phone].    ATENCIÓN: Si habla español, tiene a craft disposición servicios gratuitos de asistencia lingüística. Llame al [phone].    We comply with applicable federal civil rights laws and [place] laws. We do not discriminate on the basis of race, color, national origin, age, disability, sex, sexual orientation, or gender identity.            After Visit Summary       This is your record. Keep this with you and show to your community pharmacist(s) and doctor(s) at your next visit.             "

## 2018-10-02 NOTE — ED AVS SNAPSHOT
[facility] Emergency Department    [address]    [facility] [address]    Phone:  [phone]    Fax:  [phone]                                       Patricia Cedeño   MRN: [id number]    Department:  [facility] Emergency Department   Date of Visit:  10/2/2018           After Visit Summary Signature Page     I have received my discharge instructions, and my questions have been answered. I have discussed any challenges I see with this plan with the nurse or doctor.    ..........................................................................................................................................  Patient/Patient Representative Signature      ..........................................................................................................................................  Patient Representative Print Name and Relationship to Patient    ..................................................               ................................................  Date                                   Time    ..........................................................................................................................................  Reviewed by Signature/Title    ...................................................              ..............................................  Date                                               Time          22EPIC Rev 08/18

## 2018-10-03 NOTE — ED TRIAGE NOTES
Patient sent over from [facility] for eval of abdominal pain which started at 3am. Patient has h/o diverticulitis.

## 2018-10-03 NOTE — ED PROVIDER NOTES
History     Chief Complaint:  Abdominal Pain    The history is limited by the absence of a caregiver.      Patricia Cedeño is a 57 year old female with a history of diverticulitis who presents to the emergency department today for evaluation of abdominal pain. The patient reports that she developed a constant pressure like pain in her groin and lower abdomen with radiation into her back around 0300 this morning. She notes a few episodes of loose stools before developing an urgency to have a bowel movement but was unable to relieve herself. Her symptoms prompted her to present to Urgent Care, where she was referred to the ED for evaluation and treatment. The patient denies any fever or change in urination. Of note, the patient is menopausal and has not had her period in four years.    Allergies:  Escitalopram  Metoclopramide  Prochlorperazine     Medications:    Albuterol  Zynalar  Duoneb  Dulera    Past Medical History:    Acute eczema of hand  Asthma  Diverticulitis  Ectopic pregnancy  Denies history of kidney stones    Past Surgical History:    Ectopic pregnancy    Family History:    Father: type II diabetes    Social History:  The patient was accompanied to the ED by her .  Smoking Status: Never Smoker  Smokeless Tobacco: Never Used  Alcohol Use: Negative  Marital Status:        Review of Systems   Constitutional: Negative for chills and fever.   Respiratory: Negative for cough.    Gastrointestinal: Positive for abdominal pain and diarrhea. Negative for nausea and vomiting.        Urgency   Genitourinary: Negative for difficulty urinating, dysuria, frequency and urgency.   All other systems reviewed and are negative.    Physical Exam     Patient Vitals for the past 24 hrs:   BP Temp Temp src Heart Rate Resp SpO2 Weight   10/02/18 2217 - - - - - 98 % -   10/02/18 2216 122/64 - - - - - -   10/02/18 2014 145/74 98.6  F (37  C) Oral 78 16 98 % 64.9 kg (143 lb)     Physical Exam  General: Alert,  Mild  discomfort, well kept  Eyes: PERRL, conjunctivae pink no scleral icterus or conjunctival injection  ENT:   Moist mucus membranes, posterior oropharynx clear without erythema or exudates, No lymphadenopathy, Normal voice  Resp:  Lungs clear to auscultation bilaterally, no crackles/rubs/wheezes. Good air movement  CV:  Normal rate and rhythm, no murmurs/rubs/gallops  GI:  Abdomen soft and non-distended.  Normoactive BS.  Suprapubic and right lower quadrant tenderness. No guarding or rebound, No masses  Skin:  Warm, dry.  No rashes or petechiae  Musculoskeletal: No peripheral edema or calf tenderness, Normal gross ROM   Neuro: Alert and oriented to person/place/time, normal sensation  Psychiatric: Normal affect, cooperative, good eye contact    Emergency Department Course     Imaging:  Radiology findings were communicated with the patient who voiced understanding of the findings.    CT Abdomen Pelvis w Contrast  1. Mild fat stranding adjacent to the gallbladder. Cholecystitis is a  possibility. Correlate clinically and with ultrasound if indicated.  2. Colonic diverticula without convincing diverticulitis.  3. Slight thick-walled appearance of the left colon probably  physiologic though mild left-sided colitis could not be completely  excluded.  Reading per radiology    Laboratory:  Laboratory findings were communicated with the patient who voiced understanding of the findings.    UA Reflex to Microscopic: Blood large (A), Leukocyte Esterase large (A), RBC 8 (H), WBC 40 (H), WBC Clumps present (A), Bacteria few (A), o/w WNL  CBC: WBC 12.0 (H), ,   BMP: WNL (Creatinine 0.73)    Interventions:  2035 NS 1000 ml IV    Emergency Department Course:    2018 Nursing notes and vitals reviewed.    2022 I performed an exam of the patient as documented above.     2034 IV was inserted and blood was drawn for laboratory testing, results above.    2048 The patient was sent for a CT of the abdomen and pelvis while  in the emergency department, results above.     2125 The patient provided a urine sample here in the emergency department. This was sent for laboratory testing, findings above.    2226 I personally reviewed the laboratory and imaging results with the patient and answered all related questions prior to discharge.    Impression & Plan      Medical Decision Making:  Patricia Cedeño is a 57 year old female who presents to the emergency department today for evaluation of lower abdominal discomfort.  She stated symptoms started this morning at 3 AM.  Approximately 2 months ago she had a history of diverticulitis and was concerned that this may be a recurrence of diverticulitis.  Her examination did show suprapubic/right lower quadrant tenderness.  CT scan was obtained which showed no acute process in this area.  There was some stranding around the gallbladder however her exam and history are not consistent with cholecystitis.  She does not have signs or symptoms consistent with colitis.  Her urinalysis is consistent with urinary tract infection.  This appears to be the likely cause of her discomfort at this time.  She is started on Keflex.  No evidence of pyelonephritis.  She does appear to be safe and appropriate for outpatient management follow-up and is discharged home.    Diagnosis:    ICD-10-CM    1. UTI (urinary tract infection), uncomplicated N39.0      Disposition:   The patient is discharged to home.    Discharge Medications:  New Prescriptions    CEPHALEXIN (KEFLEX) 500 MG CAPSULE    Take 1 capsule (500 mg) by mouth 2 times daily for 10 days     Scribe Disclosure:  I, Reny Harrell, am serving as a scribe at 8:20 PM on 10/2/2018 to document services personally performed by Dustin Moss APRN based on my observations and the provider's statements to me.    [facility] EMERGENCY DEPARTMENT       Dustin Moss APRN CNP  10/02/18 2221

## 2021-12-31 ENCOUNTER — NURSE TRIAGE (OUTPATIENT)
Dept: NURSING | Facility: CLINIC | Age: 61
End: 2021-12-31

## 2021-12-31 NOTE — TELEPHONE ENCOUNTER
Patricia tested positive for covid.  Currently has pressure on chest and a cough.  Denies fever and denies shortness of breath.  Denies severe chest pain.  Patient is staying hydrated.  Patient will continue to monitor symptoms.      COVID 19 Nurse Triage Plan/Patient Instructions    Please be aware that novel coronavirus (COVID-19) may be circulating in the community. If you develop symptoms such as fever, cough, or SOB or if you have concerns about the presence of another infection including coronavirus (COVID-19), please contact your health care provider or visit [email or web address].     Disposition/Instructions    Home care recommended. Follow home care protocol based instructions.    Thank you for taking steps to prevent the spread of this virus.  o Limit your contact with others.  o Wear a simple mask to cover your cough.  o Wash your hands well and often.    Resources    [facility]: About COVID-19: [email or web address]    CDC: What to Do If You're Sick: www.cdc.gov/coronavirus/2019-ncov/about/steps-when-sick.html    CDC: Ending Home Isolation: www.cdc.gov/coronavirus/2019-ncov/hcp/disposition-in-home-patients.html     CDC: Caring for Someone: www.cdc.gov/coronavirus/2019-ncov/if-you-are-sick/care-for-someone.html     [facility]: Interim Guidance for Hospital Discharge to Home: www.health.[facility].[place]/diseases/coronavirus/hcp/hospdischarge.pdf    [facility] clinical trials (COVID-19 research studies): [email or web address]     Below are the COVID-19 hotlines at the [place] [facility] ([facility]). Interpreters are available.   o For health questions: Call 779-080-5773 or 1-410.957.2557 (7 a.m. to 7 p.m.)  o For questions about schools and childcare: Call 030-168-6154 or 1-286.211.6073 (7 a.m. to 7 p.m.)                       Reason for Disposition    [1] COVID-19 diagnosed by positive lab test AND [2] mild symptoms (e.g., cough, fever, others) AND [3]  no complications or SOB    Additional Information    Negative: SEVERE difficulty breathing (e.g., struggling for each breath, speaks in single words)    Negative: Difficult to awaken or acting confused (e.g., disoriented, slurred speech)    Negative: Bluish (or gray) lips or face now    Negative: Shock suspected (e.g., cold/pale/clammy skin, too weak to stand, low BP, rapid pulse)    Negative: Sounds like a life-threatening emergency to the triager    Negative: SEVERE or constant chest pain or pressure (Exception: mild central chest pain, present only when coughing)    Negative: MODERATE difficulty breathing (e.g., speaks in phrases, SOB even at rest, pulse 100-120)    Negative: [1] Headache AND [2] stiff neck (can't touch chin to chest)    Negative: MILD difficulty breathing (e.g., minimal/no SOB at rest, SOB with walking, pulse <100)    Negative: Chest pain or pressure    Negative: Patient sounds very sick or weak to the triager    Negative: Fever > 103 F (39.4 C)    Negative: [1] Fever > 101 F (38.3 C) AND [2] age > 60 years    Negative: [1] Fever > 100.0 F (37.8 C) AND [2] bedridden (e.g., nursing home patient, CVA, chronic illness, recovering from surgery)    Negative: HIGH RISK for severe COVID complications (e.g., age > 64 years, obesity with BMI > 25, pregnant, chronic lung disease or other chronic medical condition)  (Exception: Already seen by PCP and no new or worsening symptoms.)    Negative: [1] HIGH RISK patient AND [2] influenza is widespread in the community AND [3] ONE OR MORE respiratory symptoms: cough, sore throat, runny or stuffy nose    Negative: [1] HIGH RISK patient AND [2] influenza exposure within the last 7 days AND [3] ONE OR MORE respiratory symptoms: cough, sore throat, runny or stuffy nose    Negative: [1] COVID-19 infection suspected by caller or triager AND [2] mild symptoms (cough, fever, or others) AND [3] negative COVID-19 rapid test    Negative: Fever present > 3 days (72  hours)    Negative: [1] Fever returns after gone for over 24 hours AND [2] symptoms worse or not improved    Negative: [1] Continuous (nonstop) coughing interferes with work or school AND [2] no improvement using cough treatment per protocol    Negative: Cough present > 3 weeks    Negative: [1] COVID-19 diagnosed by positive lab test AND [2] NO symptoms (e.g., cough, fever, others)    Protocols used: CORONAVIRUS (COVID-19) DIAGNOSED OR [id number] 8.25.2021